# Patient Record
Sex: FEMALE | Race: OTHER | NOT HISPANIC OR LATINO | URBAN - METROPOLITAN AREA
[De-identification: names, ages, dates, MRNs, and addresses within clinical notes are randomized per-mention and may not be internally consistent; named-entity substitution may affect disease eponyms.]

---

## 2023-12-23 ENCOUNTER — EMERGENCY (EMERGENCY)
Facility: HOSPITAL | Age: 23
LOS: 1 days | Discharge: HOME | End: 2023-12-23
Admitting: EMERGENCY MEDICINE
Payer: MEDICAID

## 2023-12-23 VITALS
SYSTOLIC BLOOD PRESSURE: 114 MMHG | RESPIRATION RATE: 17 BRPM | OXYGEN SATURATION: 99 % | TEMPERATURE: 98 F | DIASTOLIC BLOOD PRESSURE: 77 MMHG | HEART RATE: 99 BPM

## 2023-12-23 PROCEDURE — 99284 EMERGENCY DEPT VISIT MOD MDM: CPT

## 2023-12-23 RX ORDER — IBUPROFEN 200 MG
400 TABLET ORAL ONCE
Refills: 0 | Status: COMPLETED | OUTPATIENT
Start: 2023-12-23 | End: 2023-12-23

## 2023-12-23 RX ADMIN — Medication 400 MILLIGRAM(S): at 23:47

## 2023-12-23 NOTE — ED PROVIDER NOTE - NEUROLOGICAL, MLM
Alert and oriented, no focal deficits, no motor deficits. 5/5 strength b/l. Sensation intact. No bony ttp. No redness, no swelling. No calf ttp.

## 2023-12-23 NOTE — ED PROVIDER NOTE - OBJECTIVE STATEMENT
22 y/o female with no pmhx presents to ED c/o left big toe numbness and calf cramping x 4 days. 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 Denies heavy lifting or strenuous exercise. Denies back pain. States the medial side of her nail on her big toe feels numb. No trauma. No fevers, chills, other numbness, weakness, slurred speech, headache, n/v. No ocps or estrogen use, recent travel, surgeries, hospitalizations, le edema, hx of dvt/pe. 24 y/o female with no pmhx presents to ED c/o left big toe numbness and calf cramping x 4 days. 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 Denies heavy lifting or strenuous exercise. Denies back pain. States the medial side of her nail on her big toe feels numb. No trauma. No fevers, chills, other numbness, weakness, slurred speech, headache, n/v. No ocps or estrogen use, recent travel, surgeries, hospitalizations, le edema, hx of dvt/pe.

## 2023-12-23 NOTE — ED PROVIDER NOTE - CLINICAL SUMMARY MEDICAL DECISION MAKING FREE TEXT BOX
24 y/o female with no pmhx presents to ED c/o left big toe numbness and calf cramping x 4 days. 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 Denies heavy lifting or strenuous exercise. Denies back pain. States the medial side of her nail on her big toe feels numb. No trauma. no dvt risk factors. pt well appearing, NAD, sensation intact no bony ttp atraumatic without swelling. no calf ttp. possible radiculopathy. plan to check US r/o DVT, nsaids and check ucg. will refer to neuro. 22 y/o female with no pmhx presents to ED c/o left big toe numbness and calf cramping x 4 days. 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 Denies heavy lifting or strenuous exercise. Denies back pain. States the medial side of her nail on her big toe feels numb. No trauma. no dvt risk factors. pt well appearing, NAD, sensation intact no bony ttp atraumatic without swelling. no calf ttp. possible radiculopathy. plan to check US r/o DVT, nsaids and check ucg. will refer to neuro.

## 2023-12-23 NOTE — ED PROVIDER NOTE - PATIENT PORTAL LINK FT
You can access the FollowMyHealth Patient Portal offered by [facility] by registering at the following website: [email or web address]. By joining [employer]’s FollowMyHealth portal, you will also be able to view your health information using other applications (apps) compatible with our system. You can access the FollowMyHealth Patient Portal offered by [facility] by registering at the following website: [email or web address]. By joining [employer]’s FollowMyHealth portal, you will also be able to view your health information using other applications (apps) compatible with our system.

## 2023-12-23 NOTE — ED PROVIDER NOTE - NSFOLLOWUPINSTRUCTIONS_ED_ALL_ED_FT
Rest, ice, elevate area.  Take Motrin 600mg every 8 hrs with food for pain.  Alternate with Tylenol 500mg 1-2 tabs every 6 hours as needed. Follow up with PMD within 48-72 hrs.  Recommend follow up with Neurologist within 1 week. Any worsening pain, swelling, weakness, numbness return to ED.

## 2023-12-23 NOTE — ED ADULT TRIAGE NOTE - CHIEF COMPLAINT QUOTE
Pt arrives ambulatory to triage c/o swelling to L 1st toe x4 days. Denies known injury or bleeding/drainage. Denies PMHx.

## 2023-12-24 PROCEDURE — 93971 EXTREMITY STUDY: CPT | Mod: 26,LT
